# Patient Record
Sex: FEMALE | Race: BLACK OR AFRICAN AMERICAN | NOT HISPANIC OR LATINO | Employment: FULL TIME | URBAN - METROPOLITAN AREA
[De-identification: names, ages, dates, MRNs, and addresses within clinical notes are randomized per-mention and may not be internally consistent; named-entity substitution may affect disease eponyms.]

---

## 2023-01-31 ENCOUNTER — HOSPITAL ENCOUNTER (OUTPATIENT)
Dept: RADIOLOGY | Facility: HOSPITAL | Age: 38
Discharge: HOME | End: 2023-01-31
Attending: NURSE PRACTITIONER
Payer: COMMERCIAL

## 2023-01-31 DIAGNOSIS — R07.89 OTHER CHEST PAIN: ICD-10-CM

## 2023-01-31 PROCEDURE — 71046 XR CHEST PA AND LATERAL: ICD-10-PCS | Mod: 26,,, | Performed by: RADIOLOGY

## 2023-01-31 PROCEDURE — 71046 X-RAY EXAM CHEST 2 VIEWS: CPT | Mod: TC,PO

## 2023-01-31 PROCEDURE — 71046 X-RAY EXAM CHEST 2 VIEWS: CPT | Mod: 26,,, | Performed by: RADIOLOGY

## 2023-02-23 ENCOUNTER — TELEPHONE (OUTPATIENT)
Dept: INTERNAL MEDICINE | Facility: CLINIC | Age: 38
End: 2023-02-23
Payer: COMMERCIAL

## 2024-07-08 ENCOUNTER — TELEPHONE (OUTPATIENT)
Facility: CLINIC | Age: 39
End: 2024-07-08
Payer: COMMERCIAL

## 2024-07-08 DIAGNOSIS — D50.9 IRON DEFICIENCY ANEMIA, UNSPECIFIED IRON DEFICIENCY ANEMIA TYPE: Primary | ICD-10-CM

## 2024-07-08 NOTE — TELEPHONE ENCOUNTER
Received referral from Gladys Iniguez NP. Patient stated she would like to be seen in [place] if there were any openings this week. Scheduled patient with Dr. Barillas 7/14/24 at 940 am. Address and phone number given.

## 2024-07-17 ENCOUNTER — OFFICE VISIT (OUTPATIENT)
Dept: HEMATOLOGY/ONCOLOGY | Facility: CLINIC | Age: 39
End: 2024-07-17
Payer: COMMERCIAL

## 2024-07-17 VITALS
SYSTOLIC BLOOD PRESSURE: 144 MMHG | TEMPERATURE: 97 F | BODY MASS INDEX: 36.22 KG/M2 | HEART RATE: 92 BPM | HEIGHT: 60 IN | OXYGEN SATURATION: 100 % | WEIGHT: 184.5 LBS | RESPIRATION RATE: 14 BRPM | DIASTOLIC BLOOD PRESSURE: 93 MMHG

## 2024-07-17 DIAGNOSIS — D50.9 IRON DEFICIENCY ANEMIA, UNSPECIFIED IRON DEFICIENCY ANEMIA TYPE: ICD-10-CM

## 2024-07-17 PROCEDURE — 99204 OFFICE O/P NEW MOD 45 MIN: CPT | Mod: S$GLB,,, | Performed by: INTERNAL MEDICINE

## 2024-07-17 PROCEDURE — 3077F SYST BP >= 140 MM HG: CPT | Mod: CPTII,S$GLB,, | Performed by: INTERNAL MEDICINE

## 2024-07-17 PROCEDURE — 3008F BODY MASS INDEX DOCD: CPT | Mod: CPTII,S$GLB,, | Performed by: INTERNAL MEDICINE

## 2024-07-17 PROCEDURE — 1159F MED LIST DOCD IN RCRD: CPT | Mod: CPTII,S$GLB,, | Performed by: INTERNAL MEDICINE

## 2024-07-17 PROCEDURE — 99999 PR PBB SHADOW E&M-EST. PATIENT-LVL IV: CPT | Mod: PBBFAC,,, | Performed by: INTERNAL MEDICINE

## 2024-07-17 PROCEDURE — 3080F DIAST BP >= 90 MM HG: CPT | Mod: CPTII,S$GLB,, | Performed by: INTERNAL MEDICINE

## 2024-07-17 NOTE — PROGRESS NOTES
HPI    38 years old female reported to have large blood clots menstrual cycle.  She has anemia iron deficiency she is currently on oral iron supplements.  She is taking oral iron supplements intermittently.    She has been complaining of fatigue malaise.    She denies any GI bleeding      Past Medical History:   Diagnosis Date    Hypertension      Social History     Socioeconomic History    Marital status:    Tobacco Use    Smoking status: Never    Smokeless tobacco: Never   Substance and Sexual Activity    Alcohol use: Yes    Drug use: Never    Sexual activity: Yes     Partners: Female     Birth control/protection: None         Subjective      Review of Systems   Constitutional: Negative for appetite change, fatigue and unexpected weight change.   HENT: Negative for mouth sores.   Eyes: Negative for visual disturbance.   Respiratory: Negative for cough and shortness of breath.   Cardiovascular: Negative for chest pain.   Gastrointestinal: Negative for diarrhea.   Genitourinary: Negative for frequency.   Musculoskeletal: Negative for back pain.   Skin: Negative for rash.   Neurological: Negative for headaches.   Hematological: Negative for adenopathy.   Psychiatric/Behavioral: The patient is not nervous/anxious.   All other systems reviewed and are negative.     Objective    Physical Exam   Vitals:    07/17/24 0907   BP: (!) 144/93   Pulse: 92   Resp: 14   Temp: 97.3 °F (36.3 °C)       Constitutional: patient is oriented to person, place, and time. patient appears well-developed and well-nourished. No distress.   HENT:   Right Ear: External ear normal.   Left Ear: External ear normal.   Nose: Nose normal.   Mouth/Throat: Oropharynx is clear and moist. No oropharyngeal exudate.   Teeth, gums and lips are normal   No sinus tenderness   Palate, tongue, posterior pharynx are normal   Eyes: Conjunctivae and lids are normal.   Neck: Trachea normal and normal range of motion. No thyromegaly   Cardiovascular:  "Normal rate, regular rhythm, normal heart sounds, intact distal pulses and normal pulses.   No murmur heard.   No edema, no tenderness in the extremities.   Pulmonary/Chest: Effort normal and breath sounds normal. No accessory muscle usage. patient has no wheezes..   Abdominal: Soft. Normal appearance and bowel sounds are normal. patient exhibits no distension and no mass. There is no hepatosplenomegaly. There is no tenderness.   Musculoskeletal: Normal range of motion.   Gait is normal   No clubbing, cyanosis     Lymphadenopathy:   Head (right side): No submental and no submandibular adenopathy present.   Head (left side): No submental and no submandibular adenopathy present.   patient has no cervical adenopathy.   Right: No supraclavicular adenopathy present.   Left: No supraclavicular adenopathy present.   Neurological: patient is alert and oriented to person, place, and time. patient has normal strength and normal reflexes. No sensory deficit. Gait normal.   Skin: Skin is warm, dry and intact. No bruising, no lesion and no rash noted. No cyanosis. Nails show no clubbing.   No lesions   Psychiatric: patient has a normal mood and affect. patient speech is normal and behavior is normal. Judgment normal. Cognition and memory are normal.   Vitals reviewed.     No results found for: "WBC", "HGB", "HCT", "MCV", "PLT"    CMP  Sodium   Date Value Ref Range Status   10/21/2023 140 136 - 144 mmol/L Final   03/21/2023 137 136 - 145 mmol/L Final     Potassium   Date Value Ref Range Status   10/21/2023 4.1 3.6 - 5.1 mmol/L Final     Chloride   Date Value Ref Range Status   03/21/2023 102 100 - 109 mmol/L Final     CO2   Date Value Ref Range Status   10/21/2023 25 22 - 32 mmol/L Final     Blood Urea Nitrogen   Date Value Ref Range Status   10/21/2023 8 8 - 20 mg/dL Final     Creatinine   Date Value Ref Range Status   10/21/2023 0.81 0.60 - 1.10 mg/dL Final     Calcium   Date Value Ref Range Status   10/21/2023 9.5 8.9 - 10.3 " mg/dL Final     Total Protein   Date Value Ref Range Status   10/21/2023 8.0 (H) 6.1 - 7.9 g/dL Final     Albumin   Date Value Ref Range Status   10/21/2023 4.7 3.5 - 4.8 g/dL Final     Total Bilirubin   Date Value Ref Range Status   10/21/2023 0.4 0.4 - 2.0 mg/dL Final     Alkaline Phosphatase   Date Value Ref Range Status   10/21/2023 59 28 - 116 U/L Final     AST   Date Value Ref Range Status   10/21/2023 28 10 - 34 U/L Final     ALT   Date Value Ref Range Status   10/21/2023 21 5 - 41 U/L Final     Anion Gap   Date Value Ref Range Status   10/21/2023 11 7 - 16 mmol/L Final         Assessment    Anemia microcytic.  Suspect iron deficiency due to menstrual cycles.  No iron study in epic at the time of this clinical visit.  Patient is intermittently taking oral iron supplements.  Complaining of fatigue malaise.  Denies any GI bleeding.    Patient is going to be moving to [place] in about 14 days.  She would like to start her treatments and follow-up in [place].  She will establish care over there.  She declined any workup over here.    > as above microcytic anemia suspect iron deficiency due to menstrual cycle.  Patient will be relocating to [place].  She will establish care over there and start the workup and treatments.  Declined any therapy and follow up over here.  Hematology will sign off from [place].  For now she may continue to take oral iron supplements as instructed    Plan    Iron deficiency anemia, unspecified iron deficiency anemia type  -     Ambulatory referral/consult to Hematology / Oncology